# Patient Record
Sex: MALE | Race: WHITE
[De-identification: names, ages, dates, MRNs, and addresses within clinical notes are randomized per-mention and may not be internally consistent; named-entity substitution may affect disease eponyms.]

---

## 2019-10-15 ENCOUNTER — HOSPITAL ENCOUNTER (EMERGENCY)
Dept: HOSPITAL 11 - JP.ED | Age: 64
Discharge: NURSING, REHAB OR LONG-TERM CARE | End: 2019-10-15
Payer: COMMERCIAL

## 2019-10-15 DIAGNOSIS — I10: ICD-10-CM

## 2019-10-15 DIAGNOSIS — Z79.899: ICD-10-CM

## 2019-10-15 DIAGNOSIS — A41.9: Primary | ICD-10-CM

## 2019-10-15 DIAGNOSIS — F17.210: ICD-10-CM

## 2019-10-15 DIAGNOSIS — I95.9: ICD-10-CM

## 2019-10-15 PROCEDURE — 80053 COMPREHEN METABOLIC PANEL: CPT

## 2019-10-15 PROCEDURE — 87077 CULTURE AEROBIC IDENTIFY: CPT

## 2019-10-15 PROCEDURE — 93005 ELECTROCARDIOGRAM TRACING: CPT

## 2019-10-15 PROCEDURE — 99285 EMERGENCY DEPT VISIT HI MDM: CPT

## 2019-10-15 PROCEDURE — 87804 INFLUENZA ASSAY W/OPTIC: CPT

## 2019-10-15 PROCEDURE — 96365 THER/PROPH/DIAG IV INF INIT: CPT

## 2019-10-15 PROCEDURE — 86753 PROTOZOA ANTIBODY NOS: CPT

## 2019-10-15 PROCEDURE — 83605 ASSAY OF LACTIC ACID: CPT

## 2019-10-15 PROCEDURE — 36415 COLL VENOUS BLD VENIPUNCTURE: CPT

## 2019-10-15 PROCEDURE — 71045 X-RAY EXAM CHEST 1 VIEW: CPT

## 2019-10-15 PROCEDURE — 86618 LYME DISEASE ANTIBODY: CPT

## 2019-10-15 PROCEDURE — 85025 COMPLETE CBC W/AUTO DIFF WBC: CPT

## 2019-10-15 PROCEDURE — 96375 TX/PRO/DX INJ NEW DRUG ADDON: CPT

## 2019-10-15 PROCEDURE — 87040 BLOOD CULTURE FOR BACTERIA: CPT

## 2019-10-15 PROCEDURE — 96361 HYDRATE IV INFUSION ADD-ON: CPT

## 2019-10-15 PROCEDURE — 86666 EHRLICHIA ANTIBODY: CPT

## 2019-10-15 NOTE — CRLCR
HISTORY:



Fever and sepsis 



COMPARISON:



None available 



FINDINGS:



A portable semi-erect AP view of the chest was obtained at 1927 hours. 



The lungs are clear. No focal or diffuse infiltrates are present.



The heart is mildly enlarged. 



The mediastinum is otherwise normal in appearance. 



The osseous structures are normal in appearance for the patient`s age. 



IMPRESSION:



No active disease seen in the chest. 



Mild cardiomegaly.



Dictated by Will Gudino MD @ Oct 15 2019  7:49PM



Signed by Dr. Will Gudino @ Oct 15 2019  7:50PM

## 2019-10-15 NOTE — EDM.PDOC
<Sincere Kilgore GEN - Last Filed: 10/15/19 20:16>





ED HPI GENERAL MEDICAL PROBLEM





- General


Chief Complaint: Fever


Stated Complaint: MEDICAL VIA NORTH


Time Seen by Provider: 10/15/19 16:10





- Related Data


 Allergies











Allergy/AdvReac Type Severity Reaction Status Date / Time


 


No Known Allergies Allergy   Verified 10/15/19 16:06











Home Meds: 


 Home Meds





Lisinopril 10 mg PO DAILY 10/15/19 [History]


Simvastatin 20 mg PO DAILY 10/15/19 [History]











Course





- Vital Signs


Last Recorded V/S: 


 Last Vital Signs











Temp  3.5 C L  10/15/19 19:57


 


Pulse  114 H  10/15/19 19:13


 


Resp  24 H  10/15/19 19:57


 


BP  101/63   10/15/19 19:57


 


Pulse Ox  95   10/15/19 19:57














- Orders/Labs/Meds


Labs: 


 Laboratory Tests











  10/15/19 10/15/19 10/15/19 Range/Units





  16:37 16:37 16:37 


 


WBC  8.6    (4.5-11.0)  K/uL


 


RBC  5.63    (4.30-5.90)  M/uL


 


Hgb  16.9 H    (12.0-15.0)  g/dL


 


Hct  50.1    (40.0-54.0)  %


 


MCV  89    (80-98)  fL


 


MCH  30    (27-31)  pg


 


MCHC  34    (32-36)  %


 


Plt Count  62 L    (150-400)  K/uL


 


Add Manual Diff  Yes    


 


Neutrophils % (Manual)  77 H    (36-66)  %


 


Band Neutrophils %  8    (5-11)  %


 


Lymphocytes % (Manual)  4 L    (24-44)  %


 


Monocytes % (Manual)  10 H    (2-6)  %


 


Blast Cells %  1    %


 


Sodium   134 L   (140-148)  mmol/L


 


Potassium   3.4 L   (3.6-5.2)  mmol/L


 


Chloride   97 L   (100-108)  mmol/L


 


Carbon Dioxide   25   (21-32)  mmol/L


 


Anion Gap   15.4 H   (5.0-14.0)  mmol/L


 


BUN   24 H   (7-18)  mg/dL


 


Creatinine   1.6 H   (0.8-1.3)  mg/dL


 


Est Cr Clr Drug Dosing   54.10   mL/min


 


Estimated GFR (MDRD)   44 L   (>60)  


 


Glucose   150 H   ()  mg/dL


 


Lactic Acid    2.6 H  (0.4-2.0)  mmol/L


 


Calcium   8.6   (8.5-10.1)  mg/dL


 


Total Bilirubin   1.5 H   (0.2-1.0)  mg/dL


 


AST   110 H   (15-37)  U/L


 


ALT   66   (12-78)  U/L


 


Alkaline Phosphatase   130 H   ()  U/L


 


Total Protein   6.4   (6.4-8.2)  g/dL


 


Albumin   3.4   (3.4-5.0)  g/dL


 


Globulin   3.0   (2.3-3.5)  g/dL


 


Albumin/Globulin Ratio   1.1 L   (1.2-2.2)  


 


Babesia microti IgG Ab     (Neg:<1:10)  


 


Babesia microti IgM Ab     (Neg:<1:10)  


 


Lyme Disease IgG/IgM     (0.00-0.90)  ISR


 


HGE IgG Antibody     (Neg:<1:64)  


 


HGE IgM Antibody     (Neg:<1:20)  














  10/15/19 10/15/19 10/15/19 Range/Units





  16:37 16:37 16:37 


 


WBC     (4.5-11.0)  K/uL


 


RBC     (4.30-5.90)  M/uL


 


Hgb     (12.0-15.0)  g/dL


 


Hct     (40.0-54.0)  %


 


MCV     (80-98)  fL


 


MCH     (27-31)  pg


 


MCHC     (32-36)  %


 


Plt Count     (150-400)  K/uL


 


Add Manual Diff     


 


Neutrophils % (Manual)     (36-66)  %


 


Band Neutrophils %     (5-11)  %


 


Lymphocytes % (Manual)     (24-44)  %


 


Monocytes % (Manual)     (2-6)  %


 


Blast Cells %     %


 


Sodium     (140-148)  mmol/L


 


Potassium     (3.6-5.2)  mmol/L


 


Chloride     (100-108)  mmol/L


 


Carbon Dioxide     (21-32)  mmol/L


 


Anion Gap     (5.0-14.0)  mmol/L


 


BUN     (7-18)  mg/dL


 


Creatinine     (0.8-1.3)  mg/dL


 


Est Cr Clr Drug Dosing     mL/min


 


Estimated GFR (MDRD)     (>60)  


 


Glucose     ()  mg/dL


 


Lactic Acid     (0.4-2.0)  mmol/L


 


Calcium     (8.5-10.1)  mg/dL


 


Total Bilirubin     (0.2-1.0)  mg/dL


 


AST     (15-37)  U/L


 


ALT     (12-78)  U/L


 


Alkaline Phosphatase     ()  U/L


 


Total Protein     (6.4-8.2)  g/dL


 


Albumin     (3.4-5.0)  g/dL


 


Globulin     (2.3-3.5)  g/dL


 


Albumin/Globulin Ratio     (1.2-2.2)  


 


Babesia microti IgG Ab    <1:10  (Neg:<1:10)  


 


Babesia microti IgM Ab    <1:10  (Neg:<1:10)  


 


Lyme Disease IgG/IgM  <0.91    (0.00-0.90)  ISR


 


HGE IgG Antibody   Negative   (Neg:<1:64)  


 


HGE IgM Antibody   Negative   (Neg:<1:20)  











Meds: 


Medications














Discontinued Medications














Generic Name Dose Route Start Last Admin





  Trade Name Freq  PRN Reason Stop Dose Admin


 


Acetaminophen  650 mg  10/15/19 18:30  10/15/19 18:32





  Tylenol  PO  10/15/19 18:31  650 mg





  NOW ONE   Administration





     





     





     





     


 


Doxycycline Hyclate  100 mg  10/15/19 17:50  10/15/19 18:29





  Vibramycin  PO  10/15/19 17:51  100 mg





  ONETIME ONE   Administration





     





     





     





     


 


Sodium Chloride  1,000 mls @ 999 mls/hr  10/15/19 16:30  10/15/19 16:54





  Normal Saline  IV   999 mls/hr





  ASDIRECTED SOFIA   Administration





     





     





     





     


 


Sodium Chloride  1,000 mls @ 999 mls/hr  10/15/19 16:45  10/15/19 18:30





  Normal Saline  IV   999 mls/hr





  ASDIRECTED SOFIA   Administration





     





     





     





     


 


Piperacillin Sod/Tazobactam  50 mls @ 100 mls/hr  10/15/19 16:45  10/15/19 16:54





  Sod 3.375 gm/ Sodium Chloride  IV   100 mls/hr





  Q6H SOFIA   Administration





     





     





     





     


 


Sodium Chloride  1,000 mls @ 999 mls/hr  10/15/19 19:15  10/15/19 19:32





  Normal Saline  IV   999 mls/hr





  ASDIRECTED SOFIA   Administration





     





     





     





     


 


Ibuprofen  600 mg  10/15/19 19:21  10/15/19 19:36





  Motrin  PO  10/15/19 19:22  600 mg





  ONETIME ONE   Administration





     





     





     





     


 


Lorazepam  0.5 mg  10/15/19 19:00  10/15/19 19:08





  Ativan  IVPUSH  10/15/19 19:01  0.5 mg





  ONETIME ONE   Administration





     





     





     





     


 


Oseltamivir Phosphate  75 mg  10/15/19 19:16  10/15/19 19:20





  Tamiflu  PO  10/15/19 19:17  75 mg





  ONETIME ONE   Administration





     





     





     





     














- Radiology Interpretation


Free Text/Narrative:: 





chest x-ray nothing acute





- Re-Assessments/Exams


Free Text/Narrative Re-Assessment/Exam: 





10/15/19 20:16


receive this patient in approximately 1904 from Dr. Perez.





This patient is believed to be septic. Blood cultures and tick panel are 

pending. He is received IV Zosyn and oral doxycycline. An influenza test is 

negative but he did receive Tamiflu 75 mg orally. He's been hydrated with 2 L 

IV normal saline and his blood pressure has come up into the 90s and hydration 

continues.





Patient requested to be transferred to [facility]. He's aware of the additional 

ambulance charge. I spoke with Dr. Kothari at [facility] and she accepted him 

in transfer.  no beds are available at our facility


10/15/19 20:18








Departure





- Departure


Time of Disposition: 20:18


Disposition: DC/[facility]fer to [facility]


Condition: Fair


Clinical Impression: 


 Sepsis associated hypotension








- Discharge Information


Referrals: 


PCP,None [Primary Care Provider] - 


Forms:  ED Department Discharge





<Victoria Perez - Last Filed: 10/22/19 18:42>





ED HPI GENERAL MEDICAL PROBLEM





- General


Source of Information: Reports: Patient


History Limitations: Reports: No Limitations





- History of Present Illness


INITIAL COMMENTS - FREE TEXT/NARRATIVE: 





pt arrived with shaking chills and a temp of 39.  This did come on very 

suddenly. He does not know of any tick exposure. 


Onset: Today, Sudden


Duration: Hour(s):


Location: Reports: Generalized


Associated Symptoms: Reports: Fever/Chills, Headaches, Shortness of Breath, 

Weakness


Treatments PTA: Reports: IV/IO


  ** denies


Pain Score (Numeric/FACES): 0





Past Medical History


Cardiovascular History: Reports: Hypertension





- Infectious Disease History


Infectious Disease History: Reports: Chicken Pox





- Past Surgical History


HEENT Surgical History: Reports: Tonsillectomy





Social & Family History





- Tobacco Use


Smoking Status *Q: Current Every Day Smoker


Years of Tobacco use: 45


Packs/Tins Daily: 0.5





- Caffeine Use


Caffeine Use: Reports: Coffee, Soda





- Alcohol Use


Days Per Week of Alcohol Use: 5


Number of Drinks Per Day: 3


Total Drinks Per Week: 15


Date of Last Drink: 10/12/19





- Recreational Drug Use


Recreational Drug Use: No





ED ROS GENERAL





- Review of Systems


Review Of Systems: See Below


Constitutional: Reports: Fever, Chills, Malaise, Diaphoresis


HEENT: Reports: No Symptoms


Respiratory: Reports: Shortness of Breath


Cardiovascular: Reports: No Symptoms


Endocrine: Reports: No Symptoms


GI/Abdominal: Reports: No Symptoms


: Reports: No Symptoms


Musculoskeletal: Reports: No Symptoms


Neurological: Reports: Confusion, Other ( very vague with answers. )





ED EXAM, SEPSIS





- Physical Exam


Exam: See Below


Text/Narrative:: 





pt arrived with shaking chills He was very weak and shakey and vague with his 

answers. 


Exam Limited By: No Limitations


General Appearance: Alert, Anxious, Other ( vague appearing. )


Ears: Normal TMs


Throat/Mouth: Normal Inspection


Head: Atraumatic


Neck: Normal Inspection


Respiratory/Chest: Other (rapid resp. )


Cardiovascular: Regular Rate, Rhythm, Tachycardia, Systolic Murmur (pt states 

he has never had a murmur in the past. )


GI/Abdominal Exam: Soft, Non-Tender


 (Male) Exam: Deferred


Rectal (Males) Exam: Deferred


Back: Normal Inspection


Extremities: Normal Inspection


Neurological: Alert, Oriented, Normal Cognition





Course





- Re-Assessments/Exams


Free Text/Narrative Re-Assessment/Exam: 





10/15/19 19:07


pt has a elevated  lactic acid.  he is dehydrated. Pt was gien 2 liters of 

fluid. He was given zosyn and doxycyline.  blood cultures were drawn.

## 2019-10-18 LAB
B MICROTI IGG TITR SER: (no result) {TITER}
B MICROTI IGM TITR SER: (no result) {TITER}
LYME IGG/IGM AB: <0.91 ISR (ref 0–0.9)

## 2022-10-27 ENCOUNTER — REFERRAL (OUTPATIENT)
Dept: TRANSPLANT | Facility: CLINIC | Age: 67
End: 2022-10-27

## 2022-10-27 DIAGNOSIS — C22.0 HCC (HEPATOCELLULAR CARCINOMA) (H): Primary | ICD-10-CM

## 2022-10-27 NOTE — LETTER
10/28/2022    Pedro Yates Jr.  [address] [facility] [address]          Dear Pedro,    Thank you for your interest in the Transplant Center at [facility]. We look forward to being a part of your care team and assisting you through the transplant process.    As we discussed, your transplant coordinator is Ruy Baxter (Liver).  You may call your coordinator at any time with questions or concerns at [phone].    Please complete the following.    1. Fill out and return the enclosed forms    Authorization for Electronic Communication    Authorization to Discuss Protected Health Information    Authorization for Release of Protected Health Information    Authorization for Care Everywhere Release of Information    2. Sign up for:    [employer], access to your electronic medical record (see enclosed pamphlet)    [email or web address], a transplant education website    You can use these tools to learn more about your transplant, communicate with your care team, and track your medical details      Sincerely,      Solid Organ Transplant  [facility]    cc: Referring Physician and PCP

## 2022-10-28 VITALS — BODY MASS INDEX: 28.71 KG/M2 | WEIGHT: 212 LBS | HEIGHT: 72 IN

## 2022-10-28 NOTE — TELEPHONE ENCOUNTER
Patient was asked the following questions during liver intake call.     Referring Provider: Dr. Lucina Manley  Referring Diagnosis: HCC  PCP: Dr. Miah Zavala     1)Do you know why you have liver disease: Yes       If Alcoholic Cirrhosis is present when was your last drink: 4/4/2022       Have you ever been through treatment for alcohol: No  2) Presence of Ascites: Yes Paracentesis: Yes  3) Presence of Hepatic Encephalopathy: No Medications: No  4) History of GI Bleeding: No  5) Oxygen Use: None  6) EGD: Yes @ [facility]  7) Colonoscopy: No   8) MELD Score:  10  9) Labs available for review from PCP/GI: Yes  10)HCC Diagnosis: Yes                    Abdominal CT: Yes    MRI:Yes    Bone Scan: No                PET: Yes    Chest CT: No    Treatment Notes w/ Images: Yes                11)Insurance Information: Medicare/Lend[employer]  Policy Hartmann: Self  Subscriber/Policy/ID Number: [id number]  Group Number:     Referral intake process completed.  Patient is aware that after financial approval is received, medical records will be requested.   Patient confirmed for a callback from transplant coordinator on 11/2/22.  Tentative evaluation date TBD.    Confirmed coordinator will discuss evaluation process in more detail at the time of their call.   Patient is aware of the need to arrange age appropriate cancer screening, vaccinations, and dental care.  Reminded patient to complete questionnaire, complete medical records release, and review packet prior to evaluation visit .  Assessed patient for special needs (ie--wheelchair, assistance, guardian, and ):  None  Patient instructed to call [phone] with questions.     Patient gave verbal consent during intake call to obtain medical records and documents outside of [facility]:  Yes     EUGENIA Guerra, LPN   Transplant

## 2022-11-02 NOTE — TELEPHONE ENCOUNTER
General  Route to LPN    Reason for call: Don called stating he had a phone appointment from 10-12 today and wanted to know if we still were going to call him.     He will be away from 1:10-3:30 today for another appointment    Call back needed? Yes  Return Call Needed  Same as documented in contacts section  When to return call?: Now: [facility] RN first, if no answer Page

## 2023-02-06 NOTE — TELEPHONE ENCOUNTER
Liver Transplant Referral Status  February 6, 2023    Patient's case reviewed via chart review.  Plan was made in regards to the status of the patient's Liver Transplant Referral:    Spoke to patient and he is not interested in pursuing further evaluation at this time.  Closed referral.    Discussed with Primary Liver Coordinator, DAVIDR updated.